# Patient Record
Sex: MALE | Race: OTHER | HISPANIC OR LATINO | URBAN - METROPOLITAN AREA
[De-identification: names, ages, dates, MRNs, and addresses within clinical notes are randomized per-mention and may not be internally consistent; named-entity substitution may affect disease eponyms.]

---

## 2023-11-22 ENCOUNTER — EMERGENCY (EMERGENCY)
Facility: HOSPITAL | Age: 38
LOS: 1 days | Discharge: HOME | End: 2023-11-22
Attending: EMERGENCY MEDICINE
Payer: COMMERCIAL

## 2023-11-22 VITALS
TEMPERATURE: 101 F | DIASTOLIC BLOOD PRESSURE: 87 MMHG | SYSTOLIC BLOOD PRESSURE: 151 MMHG | HEART RATE: 86 BPM | RESPIRATION RATE: 18 BRPM | OXYGEN SATURATION: 98 %

## 2023-11-22 PROCEDURE — 99283 EMERGENCY DEPT VISIT LOW MDM: CPT

## 2023-11-22 PROCEDURE — T1013: CPT

## 2023-11-22 RX ORDER — HYDROCORTISONE 1 %
1 OINTMENT (GRAM) TOPICAL
Qty: 1 | Refills: 0
Start: 2023-11-22 | End: 2023-12-05

## 2023-11-22 NOTE — ED PROVIDER NOTE - PATIENT PORTAL LINK FT
You can access the FollowMyHealth Patient Portal offered by [facility] by registering at the following website: [email or web address]. By joining [facility]’s FollowMyHealth portal, you will also be able to view your health information using other applications (apps) compatible with our system.

## 2023-11-22 NOTE — ED ADULT NURSE NOTE - PRO INTERPRETER NEED 2
Hi!  Can you check on the status of scheduling his EGD ordered 6/29?  Thanks!  
Will call pt to schedule as soon as I can.   
Emirati

## 2023-11-22 NOTE — ED PROVIDER NOTE - CARE PROVIDER_API CALL
Zara Mcclain  Gastroenterology  [address], [address]  [place] [address]  Phone: [phone]  Fax: [phone]  Follow Up Time:

## 2023-11-22 NOTE — ED ADULT NURSE NOTE - NS_NURSE_DISC_TEACHING_YN_ED_ALL_ED
Birth Control Pills Pregnancy And Lactation Text: This medication should be avoided if pregnant and for the first 30 days post-partum. Topical Retinoid counseling:  Patient advised to apply a pea-sized amount only at bedtime and wait 30 minutes after washing their face before applying.  If too drying, patient may add a non-comedogenic moisturizer. The patient verbalized understanding of the proper use and possible adverse effects of retinoids.  All of the patient's questions and concerns were addressed. Tetracycline Counseling: Patient counseled regarding possible photosensitivity and increased risk for sunburn.  Patient instructed to avoid sunlight, if possible.  When exposed to sunlight, patients should wear protective clothing, sunglasses, and sunscreen.  The patient was instructed to call the office immediately if the following severe adverse effects occur:  hearing changes, easy bruising/bleeding, severe headache, or vision changes.  The patient verbalized understanding of the proper use and possible adverse effects of tetracycline.  All of the patient's questions and concerns were addressed. Patient understands to avoid pregnancy while on therapy due to potential birth defects. Dapsone Pregnancy And Lactation Text: This medication is Pregnancy Category C and is not considered safe during pregnancy or breast feeding. Topical Clindamycin Counseling: Patient counseled that this medication may cause skin irritation or allergic reactions.  In the event of skin irritation, the patient was advised to reduce the amount of the drug applied or use it less frequently.   The patient verbalized understanding of the proper use and possible adverse effects of clindamycin.  All of the patient's questions and concerns were addressed. Dapsone Counseling: I discussed with the patient the risks of dapsone including but not limited to hemolytic anemia, agranulocytosis, rashes, methemoglobinemia, kidney failure, peripheral neuropathy, headaches, GI upset, and liver toxicity.  Patients who start dapsone require monitoring including baseline LFTs and weekly CBCs for the first month, then every month thereafter.  The patient verbalized understanding of the proper use and possible adverse effects of dapsone.  All of the patient's questions and concerns were addressed. Topical Sulfur Applications Counseling: Topical Sulfur Counseling: Patient counseled that this medication may cause skin irritation or allergic reactions.  In the event of skin irritation, the patient was advised to reduce the amount of the drug applied or use it less frequently.   The patient verbalized understanding of the proper use and possible adverse effects of topical sulfur application.  All of the patient's questions and concerns were addressed. Sarecycline Pregnancy And Lactation Text: This medication is Pregnancy Category D and not consider safe during pregnancy. It is also excreted in breast milk. Benzoyl Peroxide Counseling: Patient counseled that medicine may cause skin irritation and bleach clothing.  In the event of skin irritation, the patient was advised to reduce the amount of the drug applied or use it less frequently.   The patient verbalized understanding of the proper use and possible adverse effects of benzoyl peroxide.  All of the patient's questions and concerns were addressed. Doxycycline Pregnancy And Lactation Text: This medication is Pregnancy Category D and not consider safe during pregnancy. It is also excreted in breast milk but is considered safe for shorter treatment courses. Detail Level: Simple Azithromycin Pregnancy And Lactation Text: This medication is considered safe during pregnancy and is also secreted in breast milk. Azithromycin Counseling:  I discussed with the patient the risks of azithromycin including but not limited to GI upset, allergic reaction, drug rash, diarrhea, and yeast infections. Sarecycline Counseling: Patient advised regarding possible photosensitivity and discoloration of the teeth, skin, lips, tongue and gums.  Patient instructed to avoid sunlight, if possible.  When exposed to sunlight, patients should wear protective clothing, sunglasses, and sunscreen.  The patient was instructed to call the office immediately if the following severe adverse effects occur:  hearing changes, easy bruising/bleeding, severe headache, or vision changes.  The patient verbalized understanding of the proper use and possible adverse effects of sarecycline.  All of the patient's questions and concerns were addressed. Tazorac Pregnancy And Lactation Text: This medication is not safe during pregnancy. It is unknown if this medication is excreted in breast milk. Benzoyl Peroxide Pregnancy And Lactation Text: This medication is Pregnancy Category C. It is unknown if benzoyl peroxide is excreted in breast milk. Doxycycline Counseling:  Patient counseled regarding possible photosensitivity and increased risk for sunburn.  Patient instructed to avoid sunlight, if possible.  When exposed to sunlight, patients should wear protective clothing, sunglasses, and sunscreen.  The patient was instructed to call the office immediately if the following severe adverse effects occur:  hearing changes, easy bruising/bleeding, severe headache, or vision changes.  The patient verbalized understanding of the proper use and possible adverse effects of doxycycline.  All of the patient's questions and concerns were addressed. High Dose Vitamin A Pregnancy And Lactation Text: High dose vitamin A therapy is contraindicated during pregnancy and breast feeding. Spironolactone Counseling: Patient advised regarding risks of diarrhea, abdominal pain, hyperkalemia, birth defects (for female patients), liver toxicity and renal toxicity. The patient may need blood work to monitor liver and kidney function and potassium levels while on therapy. The patient verbalized understanding of the proper use and possible adverse effects of spironolactone.  All of the patient's questions and concerns were addressed. Tazorac Counseling:  Patient advised that medication is irritating and drying.  Patient may need to apply sparingly and wash off after an hour before eventually leaving it on overnight.  The patient verbalized understanding of the proper use and possible adverse effects of tazorac.  All of the patient's questions and concerns were addressed. Isotretinoin Counseling: Patient should get monthly blood tests, not donate blood, not drive at night if vision affected, not share medication, and not undergo elective surgery for 6 months after tx completed. Side effects reviewed, pt to contact office should one occur. Erythromycin Counseling:  I discussed with the patient the risks of erythromycin including but not limited to GI upset, allergic reaction, drug rash, diarrhea, increase in liver enzymes, and yeast infections. Topical Retinoid Pregnancy And Lactation Text: This medication is Pregnancy Category C. It is unknown if this medication is excreted in breast milk. Isotretinoin Pregnancy And Lactation Text: This medication is Pregnancy Category X and is considered extremely dangerous during pregnancy. It is unknown if it is excreted in breast milk. High Dose Vitamin A Counseling: Side effects reviewed, pt to contact office should one occur. Erythromycin Pregnancy And Lactation Text: This medication is Pregnancy Category B and is considered safe during pregnancy. It is also excreted in breast milk. Spironolactone Pregnancy And Lactation Text: This medication can cause feminization of the male fetus and should be avoided during pregnancy. The active metabolite is also found in breast milk. Bactrim Counseling:  I discussed with the patient the risks of sulfa antibiotics including but not limited to GI upset, allergic reaction, drug rash, diarrhea, dizziness, photosensitivity, and yeast infections.  Rarely, more serious reactions can occur including but not limited to aplastic anemia, agranulocytosis, methemoglobinemia, blood dyscrasias, liver or kidney failure, lung infiltrates or desquamative/blistering drug rashes. Use Enhanced Medication Counseling?: No Topical Clindamycin Pregnancy And Lactation Text: This medication is Pregnancy Category B and is considered safe during pregnancy. It is unknown if it is excreted in breast milk. Birth Control Pills Counseling: Birth Control Pill Counseling: I discussed with the patient the potential side effects of OCPs including but not limited to increased risk of stroke, heart attack, thrombophlebitis, deep venous thrombosis, hepatic adenomas, breast changes, GI upset, headaches, and depression.  The patient verbalized understanding of the proper use and possible adverse effects of OCPs. All of the patient's questions and concerns were addressed. Bactrim Pregnancy And Lactation Text: This medication is Pregnancy Category D and is known to cause fetal risk.  It is also excreted in breast milk. No Minocycline Counseling: Patient advised regarding possible photosensitivity and discoloration of the teeth, skin, lips, tongue and gums.  Patient instructed to avoid sunlight, if possible.  When exposed to sunlight, patients should wear protective clothing, sunglasses, and sunscreen.  The patient was instructed to call the office immediately if the following severe adverse effects occur:  hearing changes, easy bruising/bleeding, severe headache, or vision changes.  The patient verbalized understanding of the proper use and possible adverse effects of minocycline.  All of the patient's questions and concerns were addressed. Topical Sulfur Applications Pregnancy And Lactation Text: This medication is Pregnancy Category C and has an unknown safety profile during pregnancy. It is unknown if this topical medication is excreted in breast milk.

## 2023-11-22 NOTE — ED PROVIDER NOTE - CLINICAL SUMMARY MEDICAL DECISION MAKING FREE TEXT BOX
38M presenting to the ED c/o rectal pain x 1 week, worse when wiping and sitting down. + non thrombosed hemorrhoid on PE. Will treat with anusol and GI f/u.

## 2023-11-22 NOTE — ED ADULT TRIAGE NOTE - CHIEF COMPLAINT QUOTE
Pt. endorsing pain when wiping after using the bathroom and when sitting x1 week. Denies rectal bleeding.

## 2023-11-22 NOTE — ED PROVIDER NOTE - NS ED ATTENDING STATEMENT MOD
This was a shared visit with the [facility]. I reviewed and verified the documentation and independently performed the documented:

## 2023-11-22 NOTE — ED PROVIDER NOTE - OBJECTIVE STATEMENT
38M presenting to the ED c/o rectal pain x 1 week, worse when wiping and sitting down. Pt otherwise denies fever/chills, c/p, sob, abd pain, n/v/c/d, dysuria, numbness/tingling/weakness and has no other complaints at this time.

## 2024-01-11 ENCOUNTER — APPOINTMENT (OUTPATIENT)
Dept: GASTROENTEROLOGY | Facility: CLINIC | Age: 39
End: 2024-01-11

## 2025-04-25 ENCOUNTER — EMERGENCY (EMERGENCY)
Facility: HOSPITAL | Age: 40
LOS: 1 days | End: 2025-04-25
Attending: EMERGENCY MEDICINE

## 2025-04-25 VITALS
RESPIRATION RATE: 18 BRPM | WEIGHT: 198.42 LBS | SYSTOLIC BLOOD PRESSURE: 149 MMHG | TEMPERATURE: 98 F | HEART RATE: 65 BPM | DIASTOLIC BLOOD PRESSURE: 96 MMHG | OXYGEN SATURATION: 100 %

## 2025-04-25 PROCEDURE — 99283 EMERGENCY DEPT VISIT LOW MDM: CPT

## 2025-04-25 RX ORDER — METHOCARBAMOL 500 MG/1
2 TABLET, FILM COATED ORAL
Qty: 18 | Refills: 0
Start: 2025-04-25 | End: 2025-04-27

## 2025-04-25 RX ORDER — IBUPROFEN 200 MG
600 TABLET ORAL ONCE
Refills: 0 | Status: COMPLETED | OUTPATIENT
Start: 2025-04-25 | End: 2025-04-25

## 2025-04-25 RX ORDER — IBUPROFEN 200 MG
1 TABLET ORAL
Qty: 20 | Refills: 0
Start: 2025-04-25 | End: 2025-04-29

## 2025-04-25 RX ORDER — METHOCARBAMOL 500 MG/1
1500 TABLET, FILM COATED ORAL ONCE
Refills: 0 | Status: COMPLETED | OUTPATIENT
Start: 2025-04-25 | End: 2025-04-25

## 2025-04-25 RX ADMIN — Medication 600 MILLIGRAM(S): at 21:15

## 2025-04-25 RX ADMIN — METHOCARBAMOL 1500 MILLIGRAM(S): 500 TABLET, FILM COATED ORAL at 21:15

## 2025-04-25 NOTE — ED ADULT TRIAGE NOTE - CHIEF COMPLAINT QUOTE
pt c/o R arm pain x6 months, pain has become so severe pt is unable to work due to pain, pt had xray done 1 month ago and has been doing PT, 5 sessions, but pain is worsening. +radial pulse. denies any known trauma to site.

## 2025-04-25 NOTE — ED PROVIDER NOTE - ATTENDING APP SHARED VISIT CONTRIBUTION OF CARE
40 y/o male, denies PMHx, presents with right shoulder pain radiating down to the right arm x "months." Patient had MRI right shoulder done recently showing posterior inferior labral tear and mild subacromial subdeltoid bursitis.   pe rt shoulder ; from with mild pain;  5/5 motor;  dx shoulder injury; referral to orthopedics

## 2025-04-25 NOTE — ED PROVIDER NOTE - PATIENT PORTAL LINK FT
You can access the FollowMyHealth Patient Portal offered by [facility] by registering at the following website: [email or web address]. By joining [employer]’s FollowMyHealth portal, you will also be able to view your health information using other applications (apps) compatible with our system.

## 2025-04-25 NOTE — ED ADULT NURSE NOTE - OBJECTIVE STATEMENT
Pt c/o L arm pain. Axo4. Pt denies chest pain, palpitations, sob, dizziness, recent fevers, chills, N/V/D.

## 2025-04-25 NOTE — ED PROVIDER NOTE - NSFOLLOWUPINSTRUCTIONS_ED_ALL_ED_FT
Fill your prescriptions and take as directed. Do not drive, drink alcohol or operate heavy/dangerous machinery while taking the Robaxin. Call to make an appointment to follow up with your orthopedist. Return to ER if you develop weakness in the arm, inability to move the arm or other worsening symptoms.

## 2025-04-25 NOTE — ED PROVIDER NOTE - CLINICAL SUMMARY MEDICAL DECISION MAKING FREE TEXT BOX
38 y/o male, denies PMHx, presents with right shoulder pain radiating down to the right arm x "months", likely secondary to recently diagnosed labral tear/bursitis. FROM Right shoulder intact; normal strength to LUE; NVI. Will give pain medication and recommend continued PT/outpatient follow up with ortho. Patient well appearing, NAD, non-toxic appearing. No further ED workup given at this time. Prescription for Robaxin. Advised not to drive, drink alcohol or operate heavy/dangerous machinery while taking the medicine. Supportive care. Follow up with ortho. Return precautions discussed. Patient and family verbally demonstrated understanding of results and plan. Spoke with patient via [facility]  [facility]her[facility].

## 2025-04-25 NOTE — ED PROVIDER NOTE - OBJECTIVE STATEMENT
38 y/o male, denies PMHx, presents with right shoulder pain radiating down to the right arm x "months." Patient had MRI right shoulder done recently showing posterior inferior labral tear and mild subacromial subdeltoid bursitis. Patient was evaluated by ortho and started on PT. Patient reports he did 5 PT sessions and that pain has not improved. Did not take pain medication today. Denies trauma or falls. Denies dizziness, headache, shortness of breath, chest pain, abdominal pain, nausea, vomiting, numbness or weakness.

## 2025-04-25 NOTE — ED PROVIDER NOTE - NSTIMEPROVIDERCAREINITIATE_GEN_ER
HISTORY OF PRESENT ILLNESS     HPI   This is a 44 yo male who presents with complaints of dental pain, one tooth each on the upper jaw, on either sides.  He indicates that he has poor dentition.  The pain started one week ago, and he still has not obtained an appointment with a dentist.  This patient denies any underlying neurologic,  respiratory or endocrine co-morbidities that could be initiating or contributing to the current presentation.  The patient does have underlying cardiac co-morbidities, which were reviewed during this visit.  Patient takes antiplatelet, Plavix.   Patient denies fever/chills or rigors  Patient is afebrile in the Urgent Care without any antipyretics on board.  Patient has taken OTC acetaminophen for the pain, but it has not been helpful.  Patient denies trying any other over the counter medications or maneuvers to help alleviate any of the current symptoms.  This patient admits that he has not discussed this particular issue with the primary care provider.  Patient alert and oriented x3.  No apparent physical or respiratory distress noted.    THE [facility] AND [employer] WAS REVIEWED FOR THIS PATIENT.      PAST MEDICAL, FAMILY AND SOCIAL HISTORY     Past medical history, Surgical history and significant family history was reviewed with this patient.      REVIEW OF SYSTEMS     Review of Systems   HENT: Positive for dental problem.    All other systems reviewed and are negative.  Please see the HPI for  pertinent review of systems. Otherwise, the following systems are negative including constitutional, eyes, ears, nose and throat, cardiovascular, respiratory, gastrointestinal, genitourinary, musculoskeletal, skin and/or breast, endocrine, hematologic/lymph, allergic/immunologic and psychiatric.      PHYSICAL EXAM     Physical Exam   Constitutional: He appears well-developed and well-nourished. No distress.   HENT:   Head: Normocephalic and atraumatic.   Nose: Nose  normal.   Abnormal dentition present throughout the oral cavity.  Dry mucous membranes.  Uvula and tongue in midline.  No halitosis.    Caries noted on the coronal surfaces, on the posterior aspect of the left upper and right upper 2nd molars.  There are pits and fissures on the affected tooth surfaces, which is also discolored on the tooth surface.  The tooth is broken in several areas.  No obvious gingival inflammation noted.  Also, no apparent loss of supportive connective tissues including alveolar bone.  No features of a periodontal abscess seen.     Eyes: Conjunctivae are normal.   Neck: Normal range of motion. Neck supple. No thyromegaly present.   No nuchal rigidity.  Bilateral cervical lymph nodes not palpated    Skin: He is not diaphoretic.   Nursing note and vitals reviewed.      ASSESSMENT/PLAN     PATIENT WAS INFORMED THAT NO REFILLS OF PAIN MEDICATION WOULD BE PROVIDED AFTER TODAY'S PRESCRIPTION.    A list of the [employer] [facility] [employer] ([facility] \"free or reduce cost\" clinics were provided to this patient.  The patient was encouraged to review and contact the various clinics, for the first available appointment. Patient indicated understanding      Patient was encouraged to follow up with the primary care physician within 48-72 hours, if no improvement in the current symptoms are noted.  Patient is agreeable with this plan.    Diagnoses and all orders for this visit:    Closed fracture of tooth, initial encounter    Dental caries    Other orders  -     clindamycin (CLEOCIN) 300 MG capsule; Take 1 capsule by mouth 3 times daily for 7 days.  -     acetaminophen-codeine (TYLENOL NO.3) 300-30 MG per tablet; Take 1 tablet by mouth every 6 hours as needed for Pain.  -     chlorhexidine gluconate (PERIDEX) 0.12 % solution; Swish and spit 15 mLs 2 times daily.      Patient was informed that if the symptoms worsened or changed in character, he is to follow up immediately with the primary care physician or to  go to the nearest emergency room or urgent care facility.  Patient acknowledged shared decision making at the end of our conversation.    Patient informed of the indications, benefits, and risks of the medication and is agreeable to the abovementioned advice.    Please see additional information provided in patient instruction portion of AVS.  Patient will review information and instructions and continue with conservative measures at home also.       A copy of this urgent care visit note was e-faxed, via [email or web address], to the primary care practitioner        25-Apr-2025 20:33
